# Patient Record
Sex: FEMALE
[De-identification: names, ages, dates, MRNs, and addresses within clinical notes are randomized per-mention and may not be internally consistent; named-entity substitution may affect disease eponyms.]

---

## 2017-07-20 ENCOUNTER — HOSPITAL ENCOUNTER (OUTPATIENT)
Dept: HOSPITAL 5 - SPVWC | Age: 47
Discharge: HOME | End: 2017-07-20
Attending: OBSTETRICS & GYNECOLOGY
Payer: COMMERCIAL

## 2017-07-20 DIAGNOSIS — Z12.31: Primary | ICD-10-CM

## 2017-07-20 PROCEDURE — G0202 SCR MAMMO BI INCL CAD: HCPCS

## 2017-07-20 PROCEDURE — 77067 SCR MAMMO BI INCL CAD: CPT

## 2017-07-20 NOTE — MAMMOGRAPHY REPORT
BILATERAL DIGITAL AUGMENTED SCREENING MAMMOGRAM with CAD: 07/20/17 

11:01:00



CLINICAL: Routine screening.



COMPARISON:07/20/16



FINDINGS: Screening views with and without implant displacement 

demonstrate relatively Predominantly fatty breasts (<25% 

fibroglandular) bilateral benign calcifications. No mass, architectural 

distortion or suspicious calcifications.  Intact subpectoral implants.



IMPRESSION: No mammographic evidence of malignancy.



BI-RADS CATEGORY:  2 -- Benign



RECOMMENDATION: Routine mammographic screening in one year.



ACR BI-RADS MAMMOGRAPHIC CODES:

0 = Needs additional imaging evaluation; 1 = Negative; 2 = Benign; 3 = 

Probably benign; 4 = Suspicious; 5 = Malignant; 6 = Known biopsy-proven 

malignancy



COMMENT:

      1.   Dense breast tissue, i.e., adenosis, fibrocystic 

            changes, etc., may obscure an underlying neoplasm.

      2.   Approximately 10% of cancers are not detected with

            mammography.

      3.   A negative mammography report should not delay biopsy 

            if a clinically suspicious mass is present.



COMMENT:

Patient follow-up letters are generated via our [employer] application.

## 2018-07-06 ENCOUNTER — HOSPITAL ENCOUNTER (OUTPATIENT)
Dept: HOSPITAL 5 - US | Age: 48
Discharge: HOME | End: 2018-07-06
Attending: OBSTETRICS & GYNECOLOGY
Payer: COMMERCIAL

## 2018-07-06 DIAGNOSIS — N91.1: Primary | ICD-10-CM

## 2018-07-06 PROCEDURE — 36415 COLL VENOUS BLD VENIPUNCTURE: CPT

## 2018-07-06 PROCEDURE — 84702 CHORIONIC GONADOTROPIN TEST: CPT

## 2019-07-10 ENCOUNTER — HOSPITAL ENCOUNTER (OUTPATIENT)
Dept: HOSPITAL 5 - SPVWC | Age: 49
Discharge: HOME | End: 2019-07-10
Attending: OBSTETRICS & GYNECOLOGY
Payer: COMMERCIAL

## 2019-07-10 DIAGNOSIS — Z12.31: Primary | ICD-10-CM

## 2019-07-10 PROCEDURE — 77067 SCR MAMMO BI INCL CAD: CPT

## 2019-07-11 NOTE — MAMMOGRAPHY REPORT
BILATERAL DIGITAL SCREENING MAMMOGRAM WITH CAD AND IMPLANT-DISPLACED VIEWS

 

HISTORY: SCREENING



COMPARISON: 7/20/2017



FINDINGS:  

Breast Density: predominantly fatty breast parenchymal pattern.



Digital standard and implant-displaced CC and MLO views demonstrate no mammographic evidence of malig
robert.  Bilateral subpectoral saline implants are in place.  Scattered bilateral benign calcification
s.



**IMPRESSION**



No mammographic evidence of malignancy.  If the clinical examination remains stable, recommend bilate
ral screening mammograms beginning annually at age 40 per the current [employer] g
uidelines or at intervals appropriate for the patient's risk factors.



BIRADS 2:  Benign



According to the [employer], yearly mammograms are recommended starting at age 40 
and continuing as long as a woman is in good health. Clinical Breast Exams should be part of a period
ic health exam-about every 3 years for women in their 20s and 30s and every year for women 40 and ove
r. Breast self exam is an option for women starting in their 20s. Any breast change noted on a breast
 self exam should be reported promptly to the patient's healthcare provider. Breast MRI is recommende
d for women with an approximately 20-25% or greater lifetime risk of breast cancer, including women w
ith a strong family history of breast or ovarian cancer and women who have been treated for Hodgkin's
 disease.



A negative Mammography report should not discourage follow up or biopsy of a clinically significant f
inding and/or abnormality.



Dense breast tissue may obscure small neoplasms.



This examination was interpreted with the benefit of Computer-aided Detection analysis.



The patient will be entered into a reminder system with a target due date for the next screening mamm
ogram.



Signer Name: Rod Milligan MD 

Signed: 7/11/2019 10:41 AM

 Workstation Name: [id number]